# Patient Record
Sex: MALE | Race: WHITE
[De-identification: names, ages, dates, MRNs, and addresses within clinical notes are randomized per-mention and may not be internally consistent; named-entity substitution may affect disease eponyms.]

---

## 2020-04-01 ENCOUNTER — RX ONLY (RX ONLY)
Age: 70
End: 2020-04-01

## 2020-04-01 RX ORDER — METHOTREXATE 2.5 MG/1
1 TABLET TABLET ORAL AS DIRECTED
Qty: 72 | Refills: 0 | Status: DISCONTINUED
Start: 2020-04-01 | End: 2020-08-28

## 2020-04-01 RX ORDER — FOLIC ACID 1 MG/1
1 TABLET TABLET ORAL DAILY
Qty: 30 | Refills: 0 | Status: DISCONTINUED
Start: 2020-04-01 | End: 2020-07-21

## 2020-05-26 ENCOUNTER — FOLLOW-UP (OUTPATIENT)
Dept: URBAN - METROPOLITAN AREA CLINIC 13 | Facility: CLINIC | Age: 70
Setting detail: DERMATOLOGY
End: 2020-05-26

## 2020-05-26 DIAGNOSIS — L57.0 ACTINIC KERATOSIS: ICD-10-CM

## 2020-05-26 PROCEDURE — 99213 OFFICE O/P EST LOW 20 MIN: CPT

## 2020-07-21 ENCOUNTER — RX ONLY (RX ONLY)
Age: 70
End: 2020-07-21

## 2020-07-21 RX ORDER — FOLIC ACID 1 MG/1
1 TABLET TABLET ORAL DAILY
Qty: 30 | Refills: 0
Start: 2020-07-21

## 2020-08-28 ENCOUNTER — FOLLOW-UP (OUTPATIENT)
Dept: URBAN - METROPOLITAN AREA CLINIC 13 | Facility: CLINIC | Age: 70
Setting detail: DERMATOLOGY
End: 2020-08-28

## 2020-08-28 DIAGNOSIS — L30.9 DERMATITIS, UNSPECIFIED: ICD-10-CM

## 2020-08-28 DIAGNOSIS — L70.0 ACNE VULGARIS: ICD-10-CM

## 2020-08-28 DIAGNOSIS — L85.3 XEROSIS CUTIS: ICD-10-CM

## 2020-08-28 DIAGNOSIS — D22.9 MELANOCYTIC NEVI, UNSPECIFIED: ICD-10-CM

## 2020-08-28 DIAGNOSIS — Z87.2 PERSONAL HISTORY OF DISEASES OF THE SKIN AND SUBCUTANEOUS TISSUE: ICD-10-CM

## 2020-08-28 PROCEDURE — 99213 OFFICE O/P EST LOW 20 MIN: CPT

## 2020-08-28 RX ORDER — METHOTREXATE 2.5 MG/1
1 TABLET TABLET ORAL AS DIRECTED
Qty: 72 | Refills: 0
Start: 2020-08-28

## 2020-08-28 RX ORDER — FOLIC ACID 1 MG/1
1 TABLET TABLET ORAL DAILY
Qty: 90 | Refills: 3
Start: 2020-08-28

## 2020-12-11 ENCOUNTER — FOLLOW-UP (OUTPATIENT)
Dept: URBAN - METROPOLITAN AREA CLINIC 13 | Facility: CLINIC | Age: 70
Setting detail: DERMATOLOGY
End: 2020-12-11

## 2020-12-11 DIAGNOSIS — C44.321 SQUAMOUS CELL CARCINOMA OF SKIN OF NOSE: ICD-10-CM

## 2020-12-11 PROCEDURE — 99213 OFFICE O/P EST LOW 20 MIN: CPT

## 2020-12-11 RX ORDER — METHOTREXATE 2.5 MG/1
1 TABLET TABLET ORAL AS DIRECTED
Qty: 24 | Refills: 2
Start: 2020-12-11

## 2020-12-11 RX ORDER — FOLIC ACID 1 MG/1
1 TABLET TABLET ORAL DAILY
Qty: 30 | Refills: 11
Start: 2020-12-11

## 2021-03-12 ENCOUNTER — SKIN CHECK (OUTPATIENT)
Dept: URBAN - METROPOLITAN AREA CLINIC 13 | Facility: CLINIC | Age: 71
Setting detail: DERMATOLOGY
End: 2021-03-12

## 2021-03-12 DIAGNOSIS — L82.0 INFLAMED SEBORRHEIC KERATOSIS: ICD-10-CM

## 2021-03-12 PROCEDURE — 99213 OFFICE O/P EST LOW 20 MIN: CPT

## 2021-03-12 RX ORDER — FOLIC ACID 1 MG/1
1 TABLET TABLET ORAL DAILY
Qty: 90 | Refills: 3
Start: 2021-03-12

## 2021-03-12 RX ORDER — METHOTREXATE 2.5 MG/1
6 TABLET TABLET ORAL
Qty: 72 | Refills: 0
Start: 2021-03-12

## 2021-06-14 ENCOUNTER — FOLLOW-UP (OUTPATIENT)
Dept: URBAN - METROPOLITAN AREA CLINIC 13 | Facility: CLINIC | Age: 71
Setting detail: DERMATOLOGY
End: 2021-06-14

## 2021-06-14 DIAGNOSIS — D48.5 NEOPLASM OF UNCERTAIN BEHAVIOR OF SKIN: ICD-10-CM

## 2021-06-14 PROCEDURE — 99214 OFFICE O/P EST MOD 30 MIN: CPT

## 2021-06-14 RX ORDER — METHOTREXATE 2.5 MG/1
6 TABLET TABLET ORAL
Qty: 78 | Refills: 0
Start: 2021-06-14

## 2021-09-13 ENCOUNTER — FOLLOW-UP (OUTPATIENT)
Dept: URBAN - METROPOLITAN AREA CLINIC 13 | Facility: CLINIC | Age: 71
Setting detail: DERMATOLOGY
End: 2021-09-13

## 2021-09-13 DIAGNOSIS — D48.5 NEOPLASM OF UNCERTAIN BEHAVIOR OF SKIN: ICD-10-CM

## 2021-09-13 PROCEDURE — 99214 OFFICE O/P EST MOD 30 MIN: CPT

## 2021-09-13 RX ORDER — METHOTREXATE 2.5 MG/1
TABLET ORAL AS DIRECTED
Qty: 32 | Refills: 2
Start: 2021-09-13

## 2021-12-17 ENCOUNTER — FOLLOW-UP (OUTPATIENT)
Dept: URBAN - METROPOLITAN AREA CLINIC 13 | Facility: CLINIC | Age: 71
Setting detail: DERMATOLOGY
End: 2021-12-17

## 2021-12-17 DIAGNOSIS — D48.5 NEOPLASM OF UNCERTAIN BEHAVIOR OF SKIN: ICD-10-CM

## 2021-12-17 PROCEDURE — 99214 OFFICE O/P EST MOD 30 MIN: CPT

## 2021-12-20 ENCOUNTER — RX ONLY (RX ONLY)
Age: 71
End: 2021-12-20

## 2021-12-20 RX ORDER — METHOTREXATE 2.5 MG/1
TABLET ORAL AS DIRECTED
Qty: 24 | Refills: 2
Start: 2021-12-20

## 2021-12-22 ENCOUNTER — RX ONLY (RX ONLY)
Age: 71
End: 2021-12-22

## 2021-12-22 RX ORDER — TILDRAKIZUMAB-ASMN 100 MG/ML
INJECTION, SOLUTION SUBCUTANEOUS
Qty: 1 | Refills: 1
Start: 2021-12-22

## 2021-12-30 ENCOUNTER — RX ONLY (RX ONLY)
Age: 71
End: 2021-12-30

## 2021-12-30 RX ORDER — METHOTREXATE 2.5 MG/1
TABLET ORAL AS DIRECTED
Qty: 24 | Refills: 1
Start: 2021-12-30

## 2021-12-30 RX ORDER — FOLIC ACID 1 MG/1
1 TABLET TABLET ORAL DAILY
Qty: 90 | Refills: 2
Start: 2021-12-30

## 2022-02-11 ENCOUNTER — RX ONLY (RX ONLY)
Age: 72
End: 2022-02-11

## 2022-02-11 RX ORDER — USTEKINUMAB 45 MG/.5ML
INJECTION, SOLUTION SUBCUTANEOUS
Qty: 0.5 | Refills: 1
Start: 2022-02-11

## 2022-03-18 ENCOUNTER — OTHER- (OUTPATIENT)
Dept: URBAN - METROPOLITAN AREA CLINIC 13 | Facility: CLINIC | Age: 72
Setting detail: DERMATOLOGY
End: 2022-03-18

## 2022-03-18 DIAGNOSIS — L57.0 ACTINIC KERATOSIS: ICD-10-CM

## 2022-03-18 PROBLEM — L408 696.1: Status: ACTIVE | Noted: 2022-03-18

## 2022-03-18 PROBLEM — L402 696.1: Status: ACTIVE | Noted: 2022-03-18

## 2022-03-18 PROBLEM — L404 696.1: Status: ACTIVE | Noted: 2022-03-18

## 2022-03-18 PROBLEM — L401 696.1: Status: ACTIVE | Noted: 2022-03-18

## 2022-03-18 PROBLEM — L400 696.1: Status: ACTIVE | Noted: 2022-03-18

## 2022-03-18 PROBLEM — L403 696.1: Status: ACTIVE | Noted: 2022-03-18

## 2022-03-18 PROCEDURE — 96372 THER/PROPH/DIAG INJ SC/IM: CPT

## 2022-06-13 ENCOUNTER — OTHER- (OUTPATIENT)
Dept: URBAN - METROPOLITAN AREA CLINIC 11 | Facility: CLINIC | Age: 72
Setting detail: DERMATOLOGY
End: 2022-06-13

## 2022-06-13 DIAGNOSIS — L82.0 INFLAMED SEBORRHEIC KERATOSIS: ICD-10-CM

## 2022-06-13 PROCEDURE — 96372 THER/PROPH/DIAG INJ SC/IM: CPT

## 2022-08-23 ENCOUNTER — RX ONLY (RX ONLY)
Age: 72
End: 2022-08-23

## 2022-08-23 RX ORDER — TILDRAKIZUMAB-ASMN 100 MG/ML
1 SYRINGE INJECTION, SOLUTION SUBCUTANEOUS
Qty: 1 | Refills: 1
Start: 2022-08-23

## 2022-09-14 ENCOUNTER — APPOINTMENT (OUTPATIENT)
Dept: URBAN - METROPOLITAN AREA SURGERY 19 | Age: 72
Setting detail: DERMATOLOGY
End: 2022-09-15

## 2022-09-14 DIAGNOSIS — L40.0 PSORIASIS VULGARIS: ICD-10-CM

## 2022-09-14 PROCEDURE — OTHER ILUMYA INJECTION: OTHER

## 2022-09-14 PROCEDURE — 96372 THER/PROPH/DIAG INJ SC/IM: CPT

## 2022-09-14 ASSESSMENT — LOCATION ZONE DERM: LOCATION ZONE: ARM

## 2022-09-14 ASSESSMENT — LOCATION SIMPLE DESCRIPTION DERM: LOCATION SIMPLE: LEFT SHOULDER

## 2022-09-14 ASSESSMENT — LOCATION DETAILED DESCRIPTION DERM: LOCATION DETAILED: LEFT POSTERIOR SHOULDER

## 2022-09-14 NOTE — HPI: OTHER
Condition:: Ilumya injection
Please Describe Your Condition:: Pt is currently treating his psoriasis with Ilumya

## 2022-12-14 ENCOUNTER — APPOINTMENT (OUTPATIENT)
Dept: URBAN - METROPOLITAN AREA SURGERY 19 | Age: 72
Setting detail: DERMATOLOGY
End: 2022-12-18

## 2022-12-14 DIAGNOSIS — L40.0 PSORIASIS VULGARIS: ICD-10-CM

## 2022-12-14 PROCEDURE — OTHER MIPS QUALITY: OTHER

## 2022-12-14 PROCEDURE — OTHER COUNSELING: OTHER

## 2022-12-14 PROCEDURE — OTHER ILUMYA INJECTION: OTHER

## 2022-12-14 PROCEDURE — 96372 THER/PROPH/DIAG INJ SC/IM: CPT

## 2022-12-14 ASSESSMENT — PGA PSORIASIS: PGA PSORIASIS 2020: ALMOST CLEAR

## 2022-12-14 ASSESSMENT — LOCATION DETAILED DESCRIPTION DERM: LOCATION DETAILED: LEFT ANTERIOR SHOULDER

## 2022-12-14 ASSESSMENT — LOCATION SIMPLE DESCRIPTION DERM: LOCATION SIMPLE: LEFT SHOULDER

## 2022-12-14 ASSESSMENT — BSA PSORIASIS: % BODY COVERED IN PSORIASIS: 2

## 2022-12-14 ASSESSMENT — LOCATION ZONE DERM: LOCATION ZONE: ARM

## 2022-12-14 NOTE — HPI: PROCEDURE (INJECTION)
Have You Had This Injection Before?: has been previously injected
Body Location Override (Optional): Left deltoid
When Was Your Last Injection?: 09/14/2022

## 2023-01-24 ENCOUNTER — RX ONLY (RX ONLY)
Age: 73
End: 2023-01-24

## 2023-01-24 RX ORDER — TILDRAKIZUMAB-ASMN 100 MG/ML
INJECTION, SOLUTION SUBCUTANEOUS
Qty: 1 | Refills: 1 | Status: ERX | COMMUNITY
Start: 2023-01-24

## 2023-02-07 ENCOUNTER — RX ONLY (RX ONLY)
Age: 73
End: 2023-02-07

## 2023-02-07 RX ORDER — TILDRAKIZUMAB-ASMN 100 MG/ML
INJECTION, SOLUTION SUBCUTANEOUS
Qty: 1 | Refills: 1 | Status: ERX

## 2023-03-29 ENCOUNTER — APPOINTMENT (OUTPATIENT)
Dept: URBAN - METROPOLITAN AREA SURGERY 19 | Age: 73
Setting detail: DERMATOLOGY
End: 2023-03-30

## 2023-03-29 DIAGNOSIS — L40.0 PSORIASIS VULGARIS: ICD-10-CM

## 2023-03-29 PROCEDURE — 96372 THER/PROPH/DIAG INJ SC/IM: CPT

## 2023-03-29 PROCEDURE — OTHER ILUMYA INJECTION: OTHER

## 2023-03-29 ASSESSMENT — LOCATION SIMPLE DESCRIPTION DERM: LOCATION SIMPLE: LEFT SHOULDER

## 2023-03-29 ASSESSMENT — LOCATION DETAILED DESCRIPTION DERM: LOCATION DETAILED: LEFT ANTERIOR SHOULDER

## 2023-03-29 ASSESSMENT — LOCATION ZONE DERM: LOCATION ZONE: ARM

## 2023-03-29 NOTE — HPI: PROCEDURE (INJECTION)
Have You Had This Injection Before?: has been previously injected
When Was Your Last Injection?: 12/12/2022

## 2023-05-09 ENCOUNTER — RX ONLY (RX ONLY)
Age: 73
End: 2023-05-09

## 2023-05-09 RX ORDER — TILDRAKIZUMAB-ASMN 100 MG/ML
INJECTION, SOLUTION SUBCUTANEOUS
Qty: 1 | Refills: 1 | Status: ERX

## 2023-06-30 ENCOUNTER — APPOINTMENT (OUTPATIENT)
Dept: URBAN - METROPOLITAN AREA SURGERY 19 | Age: 73
Setting detail: DERMATOLOGY
End: 2023-06-30

## 2023-06-30 DIAGNOSIS — L40.0 PSORIASIS VULGARIS: ICD-10-CM

## 2023-06-30 PROCEDURE — OTHER ILUMYA INJECTION: OTHER

## 2023-06-30 PROCEDURE — 96372 THER/PROPH/DIAG INJ SC/IM: CPT

## 2023-06-30 ASSESSMENT — LOCATION ZONE DERM: LOCATION ZONE: ARM

## 2023-06-30 ASSESSMENT — LOCATION SIMPLE DESCRIPTION DERM: LOCATION SIMPLE: RIGHT UPPER ARM

## 2023-06-30 ASSESSMENT — LOCATION DETAILED DESCRIPTION DERM: LOCATION DETAILED: RIGHT PROXIMAL POSTERIOR UPPER ARM

## 2023-09-28 ENCOUNTER — RX ONLY (RX ONLY)
Age: 73
End: 2023-09-28

## 2023-09-28 RX ORDER — TILDRAKIZUMAB-ASMN 100 MG/ML
INJECTION, SOLUTION SUBCUTANEOUS
Qty: 1 | Refills: 1 | Status: ERX

## 2023-10-09 ENCOUNTER — APPOINTMENT (OUTPATIENT)
Dept: URBAN - METROPOLITAN AREA SURGERY 19 | Age: 73
Setting detail: DERMATOLOGY
End: 2023-10-12

## 2023-10-09 DIAGNOSIS — L40.0 PSORIASIS VULGARIS: ICD-10-CM

## 2023-10-09 PROCEDURE — 99213 OFFICE O/P EST LOW 20 MIN: CPT | Mod: 25

## 2023-10-09 PROCEDURE — 36415 COLL VENOUS BLD VENIPUNCTURE: CPT

## 2023-10-09 PROCEDURE — OTHER COUNSELING: OTHER

## 2023-10-09 PROCEDURE — OTHER MIPS QUALITY: OTHER

## 2023-10-09 PROCEDURE — OTHER ILUMYA INJECTION: OTHER

## 2023-10-09 PROCEDURE — 96372 THER/PROPH/DIAG INJ SC/IM: CPT

## 2023-10-09 PROCEDURE — OTHER VENIPUNCTURE: OTHER

## 2023-10-09 PROCEDURE — OTHER ORDER TESTS: OTHER

## 2023-10-09 ASSESSMENT — BSA PSORIASIS: % BODY COVERED IN PSORIASIS: 0

## 2023-10-09 ASSESSMENT — PGA PSORIASIS: PGA PSORIASIS 2020: CLEAR

## 2023-10-09 ASSESSMENT — ITCH NUMERIC RATING SCALE: HOW SEVERE IS YOUR ITCHING?: 0

## 2023-10-09 ASSESSMENT — LOCATION SIMPLE DESCRIPTION DERM: LOCATION SIMPLE: RIGHT UPPER ARM

## 2023-10-09 ASSESSMENT — LOCATION DETAILED DESCRIPTION DERM: LOCATION DETAILED: RIGHT PROXIMAL POSTERIOR UPPER ARM

## 2023-10-09 ASSESSMENT — LOCATION ZONE DERM: LOCATION ZONE: ARM

## 2023-10-09 NOTE — PROCEDURE: ORDER TESTS
Lab Facility: [id number]
Bill For Surgical Tray: no
Billing Type: Third-Party Bill
Performing Laboratory: [id number]
Expected Date Of Service: 10/09/2023

## 2023-10-09 NOTE — PROCEDURE: COUNSELING
Patient Specific Counseling (Will Not Stick From Patient To Patient): encouraged routine immunizations and boosters including flu and covid vaccines this fall.
Detail Level: Detailed

## 2023-10-09 NOTE — PROCEDURE: VENIPUNCTURE
Bill [id number] For Specimen Handling/Conveyance To Laboratory?: no
Detail Level: None
Venipuncture Paragraph: An alcohol pad was applied to the venipuncture site. Venipuncture was performed using a butterfly needle. Pressure and a bandaid was applied to the site. No complications were noted.

## 2023-10-09 NOTE — PROCEDURE: MIPS QUALITY
Quality 226: Preventive Care And Screening: Tobacco Use: Screening And Cessation Intervention: Patient screened for tobacco use, is a smoker AND received Cessation Counseling within measurement period or in the six months prior to the measurement period
Quality 110: Preventive Care And Screening: Influenza Immunization: Influenza Immunization Ordered or Recommended, but not Administered due to system reason
Quality 130: Documentation Of Current Medications In The Medical Record: Current Medications Documented
Detail Level: Detailed

## 2023-11-20 ENCOUNTER — RX ONLY (RX ONLY)
Age: 73
End: 2023-11-20

## 2023-11-20 RX ORDER — TILDRAKIZUMAB-ASMN 100 MG/ML
INJECTION, SOLUTION SUBCUTANEOUS
Qty: 1 | Refills: 1 | Status: ERX

## 2023-12-11 ENCOUNTER — RX ONLY (RX ONLY)
Age: 73
End: 2023-12-11

## 2023-12-11 RX ORDER — TILDRAKIZUMAB-ASMN 100 MG/ML
INJECTION, SOLUTION SUBCUTANEOUS
Qty: 1 | Refills: 1 | Status: ERX

## 2024-01-03 ENCOUNTER — APPOINTMENT (OUTPATIENT)
Dept: URBAN - METROPOLITAN AREA SURGERY 19 | Age: 74
Setting detail: DERMATOLOGY
End: 2024-01-04

## 2024-01-03 DIAGNOSIS — L40.0 PSORIASIS VULGARIS: ICD-10-CM

## 2024-01-03 PROCEDURE — OTHER ILUMYA INJECTION: OTHER

## 2024-01-03 PROCEDURE — OTHER COUNSELING: OTHER

## 2024-01-03 PROCEDURE — OTHER MIPS QUALITY: OTHER

## 2024-01-03 PROCEDURE — 96372 THER/PROPH/DIAG INJ SC/IM: CPT

## 2024-01-03 ASSESSMENT — LOCATION ZONE DERM: LOCATION ZONE: ARM

## 2024-01-03 ASSESSMENT — LOCATION SIMPLE DESCRIPTION DERM: LOCATION SIMPLE: RIGHT UPPER ARM

## 2024-01-03 ASSESSMENT — LOCATION DETAILED DESCRIPTION DERM: LOCATION DETAILED: RIGHT PROXIMAL POSTERIOR UPPER ARM

## 2024-01-03 NOTE — PROCEDURE: ILUMYA INJECTION
Consent: The risks of pain and injection site reactions were reviewed with the patient prior to the injection.
J-Code Units (100 Units Per Syringe): 100
Administered By (Optional): Hayley Bernal CDT
Hide Non-Enhanced Ndc Variable: No
Lot # (Optional): [id number]
J-Code: 
Syringe Size Used (Required For Enhanced Ndc): 100mg/ml prefilled syringe
Ilumya Amount: 100 mg
Expiration Date (Optional): 12/2024
Use Enhanced Ndc?: Yes
Ndc (100 Mg/Ml Syringe): [address]-51
Detail Level: None

## 2024-01-03 NOTE — PROCEDURE: MIPS QUALITY
Quality 226: Preventive Care And Screening: Tobacco Use: Screening And Cessation Intervention: Patient screened for tobacco use, is a smoker AND received Cessation Counseling within measurement period or in the six months prior to the measurement period
Quality 410: Psoriasis Clinical Response To Oral Systemic Or Biologic Medications: Psoriasis Assessment Tool Documented, Met Specified Benchmark
Quality 110: Preventive Care And Screening: Influenza Immunization: Influenza Immunization Ordered or Recommended, but not Administered due to system reason
Quality 176: Tuberculosis Screening Prior To First Course Of Biologic And/Or Immune Response Modifier Therapy: Patient receiving first-time biologic and/or immune response modifier therapy, TB Screening Performed and Results Interpreted within 12 months
Quality 130: Documentation Of Current Medications In The Medical Record: Current Medications Documented
Detail Level: Detailed

## 2024-04-17 ENCOUNTER — APPOINTMENT (OUTPATIENT)
Dept: URBAN - METROPOLITAN AREA SURGERY 19 | Age: 74
Setting detail: DERMATOLOGY
End: 2024-04-19

## 2024-04-17 DIAGNOSIS — L40.0 PSORIASIS VULGARIS: ICD-10-CM

## 2024-04-17 PROCEDURE — 96372 THER/PROPH/DIAG INJ SC/IM: CPT

## 2024-04-17 PROCEDURE — OTHER COUNSELING: OTHER

## 2024-04-17 PROCEDURE — OTHER ILUMYA MONITORING: OTHER

## 2024-04-17 PROCEDURE — OTHER MIPS QUALITY: OTHER

## 2024-04-17 PROCEDURE — OTHER ILUMYA INJECTION: OTHER

## 2024-04-17 PROCEDURE — 99213 OFFICE O/P EST LOW 20 MIN: CPT | Mod: 25

## 2024-04-17 PROCEDURE — OTHER PRESCRIPTION MEDICATION MANAGEMENT: OTHER

## 2024-04-17 ASSESSMENT — LOCATION SIMPLE DESCRIPTION DERM
LOCATION SIMPLE: RIGHT ELBOW
LOCATION SIMPLE: LEFT ELBOW
LOCATION SIMPLE: LEFT SHOULDER

## 2024-04-17 ASSESSMENT — BSA PSORIASIS: % BODY COVERED IN PSORIASIS: 0

## 2024-04-17 ASSESSMENT — LOCATION DETAILED DESCRIPTION DERM
LOCATION DETAILED: RIGHT ELBOW
LOCATION DETAILED: LEFT POSTERIOR SHOULDER
LOCATION DETAILED: LEFT ELBOW

## 2024-04-17 ASSESSMENT — PGA PSORIASIS: PGA PSORIASIS 2020: CLEAR

## 2024-04-17 ASSESSMENT — ITCH NUMERIC RATING SCALE: HOW SEVERE IS YOUR ITCHING?: 0

## 2024-04-17 ASSESSMENT — LOCATION ZONE DERM: LOCATION ZONE: ARM

## 2024-04-17 NOTE — PROCEDURE: ILUMYA INJECTION
Consent: The risks of pain and injection site reactions were reviewed with the patient prior to the injection.
J-Code Units (100 Units Per Syringe): 100
Administered By (Optional): Hayley Bernal CDT
Hide Non-Enhanced Ndc Variable: No
Lot # (Optional): [id number]
J-Code: 
Syringe Size Used (Required For Enhanced Ndc): 100mg/ml prefilled syringe
Ilumya Amount: 100 mg
Expiration Date (Optional): 04/2025
Use Enhanced Ndc?: Yes
Ndc (100 Mg/Ml Syringe): [address]-17
Detail Level: None

## 2024-04-17 NOTE — PROCEDURE: PRESCRIPTION MEDICATION MANAGEMENT
Plan: Discussed healthy lifestyle, exercise and diet
Detail Level: Generalized
Continue Regimen: Ilumya Q12 weeks
Render In Strict Bullet Format?: No

## 2024-07-26 ENCOUNTER — APPOINTMENT (OUTPATIENT)
Dept: URBAN - METROPOLITAN AREA SURGERY 19 | Age: 74
Setting detail: DERMATOLOGY
End: 2024-07-29

## 2024-07-26 DIAGNOSIS — L57.8 OTHER SKIN CHANGES DUE TO CHRONIC EXPOSURE TO NONIONIZING RADIATION: ICD-10-CM

## 2024-07-26 DIAGNOSIS — B35.1 TINEA UNGUIUM: ICD-10-CM

## 2024-07-26 DIAGNOSIS — D22 MELANOCYTIC NEVI: ICD-10-CM

## 2024-07-26 DIAGNOSIS — D18.0 HEMANGIOMA: ICD-10-CM

## 2024-07-26 DIAGNOSIS — L21.8 OTHER SEBORRHEIC DERMATITIS: ICD-10-CM

## 2024-07-26 DIAGNOSIS — L40.0 PSORIASIS VULGARIS: ICD-10-CM

## 2024-07-26 DIAGNOSIS — L82.1 OTHER SEBORRHEIC KERATOSIS: ICD-10-CM

## 2024-07-26 PROBLEM — D18.01 HEMANGIOMA OF SKIN AND SUBCUTANEOUS TISSUE: Status: ACTIVE | Noted: 2024-07-26

## 2024-07-26 PROBLEM — D22.5 MELANOCYTIC NEVI OF TRUNK: Status: ACTIVE | Noted: 2024-07-26

## 2024-07-26 PROCEDURE — OTHER MIPS QUALITY: OTHER

## 2024-07-26 PROCEDURE — 99214 OFFICE O/P EST MOD 30 MIN: CPT | Mod: 25

## 2024-07-26 PROCEDURE — 96372 THER/PROPH/DIAG INJ SC/IM: CPT

## 2024-07-26 PROCEDURE — OTHER COUNSELING: OTHER

## 2024-07-26 PROCEDURE — OTHER ILUMYA MONITORING: OTHER

## 2024-07-26 PROCEDURE — OTHER ILUMYA INJECTION: OTHER

## 2024-07-26 ASSESSMENT — ITCH NUMERIC RATING SCALE: HOW SEVERE IS YOUR ITCHING?: 0

## 2024-07-26 ASSESSMENT — LOCATION SIMPLE DESCRIPTION DERM
LOCATION SIMPLE: TRAPEZIAL NECK
LOCATION SIMPLE: CHEST
LOCATION SIMPLE: LEFT CHEEK
LOCATION SIMPLE: LEFT UPPER ARM
LOCATION SIMPLE: UPPER BACK
LOCATION SIMPLE: LEFT GREAT TOE
LOCATION SIMPLE: RIGHT GREAT TOE
LOCATION SIMPLE: ABDOMEN

## 2024-07-26 ASSESSMENT — LOCATION DETAILED DESCRIPTION DERM
LOCATION DETAILED: RIGHT GREAT TOENAIL
LOCATION DETAILED: EPIGASTRIC SKIN
LOCATION DETAILED: INFERIOR THORACIC SPINE
LOCATION DETAILED: LEFT GREAT TOENAIL
LOCATION DETAILED: SUPERIOR THORACIC SPINE
LOCATION DETAILED: MID TRAPEZIAL NECK
LOCATION DETAILED: LEFT CENTRAL MALAR CHEEK
LOCATION DETAILED: LEFT PROXIMAL LATERAL POSTERIOR UPPER ARM
LOCATION DETAILED: RIGHT MEDIAL SUPERIOR CHEST
LOCATION DETAILED: MIDDLE STERNUM

## 2024-07-26 ASSESSMENT — LOCATION ZONE DERM
LOCATION ZONE: TOENAIL
LOCATION ZONE: FACE
LOCATION ZONE: NECK
LOCATION ZONE: ARM
LOCATION ZONE: TRUNK

## 2024-07-26 ASSESSMENT — PGA PSORIASIS: PGA PSORIASIS 2020: CLEAR

## 2024-07-26 ASSESSMENT — BSA PSORIASIS: % BODY COVERED IN PSORIASIS: 0

## 2024-07-26 NOTE — PROCEDURE: MIPS QUALITY
Additional Notes: *Tobacco counseling - patient recommended to follow-up with PCP for tobacco cessation (21 & up)
Quality 226: Preventive Care And Screening: Tobacco Use: Screening And Cessation Intervention: Patient screened for tobacco use, is a smoker AND received Cessation Counseling within measurement period or in the six months prior to the measurement period
Detail Level: Detailed

## 2024-07-26 NOTE — PROCEDURE: ILUMYA INJECTION
Hide Non-Enhanced Ndc Variable: No
Expiration Date (Optional): 04/25
Render If Medication Purchased By Clinic In Visit Note?: Yes
Syringe Size Used (Required For Enhanced Ndc): 100mg/ml prefilled syringe
Detail Level: None
Consent: The risks of pain and injection site reactions were reviewed with the patient prior to the injection.
Ndc (100 Mg/Ml Syringe): [address]-90
Ilumya Amount: 100 mg
J-Code Units (100 Units Per Syringe): 100
J-Code: 
Lot # (Optional): [id number]

## 2024-09-05 ENCOUNTER — RX ONLY (RX ONLY)
Age: 74
End: 2024-09-05

## 2024-09-05 RX ORDER — TILDRAKIZUMAB-ASMN 100 MG/ML
INJECTION, SOLUTION SUBCUTANEOUS
Qty: 1 | Refills: 0 | Status: ERX

## 2024-10-18 ENCOUNTER — APPOINTMENT (OUTPATIENT)
Dept: URBAN - METROPOLITAN AREA SURGERY 19 | Age: 74
Setting detail: DERMATOLOGY
End: 2024-10-23

## 2024-10-18 DIAGNOSIS — L40.0 PSORIASIS VULGARIS: ICD-10-CM

## 2024-10-18 PROCEDURE — OTHER ILUMYA MONITORING: OTHER

## 2024-10-18 PROCEDURE — OTHER ORDER TESTS: OTHER

## 2024-10-18 PROCEDURE — OTHER MIPS QUALITY: OTHER

## 2024-10-18 PROCEDURE — OTHER COUNSELING: OTHER

## 2024-10-18 PROCEDURE — OTHER ILUMYA INJECTION: OTHER

## 2024-10-18 PROCEDURE — 96372 THER/PROPH/DIAG INJ SC/IM: CPT

## 2024-10-18 ASSESSMENT — LOCATION DETAILED DESCRIPTION DERM: LOCATION DETAILED: RIGHT POSTERIOR SHOULDER

## 2024-10-18 ASSESSMENT — LOCATION SIMPLE DESCRIPTION DERM: LOCATION SIMPLE: RIGHT SHOULDER

## 2024-10-18 ASSESSMENT — LOCATION ZONE DERM: LOCATION ZONE: ARM

## 2024-10-18 NOTE — PROCEDURE: ILUMYA INJECTION
Hide Non-Enhanced Ndc Variable: No
Expiration Date (Optional): 10/17/2025
Additional Comments: NDC [address]-30
Render If Medication Purchased By Clinic In Visit Note?: Yes
Syringe Size Used (Required For Enhanced Ndc): 100mg/ml prefilled syringe
Detail Level: Simple
Consent: The risks of pain and injection site reactions were reviewed with the patient prior to the injection.
Ndc (100 Mg/Ml Syringe): [address]-27
Ilumya Amount: 100 mg
J-Code Units (100 Units Per Syringe): 100
Administered By (Optional): Maryam OCHOA
J-Code:

## 2024-10-18 NOTE — PROCEDURE: ORDER TESTS
Expected Date Of Service: 10/21/2024
Performing Laboratory: -[id number]
Billing Type: Third-Party Bill
Bill For Surgical Tray: no

## 2025-02-10 ENCOUNTER — APPOINTMENT (OUTPATIENT)
Dept: URBAN - METROPOLITAN AREA SURGERY 19 | Age: 75
Setting detail: DERMATOLOGY
End: 2025-02-10

## 2025-02-10 DIAGNOSIS — L40.0 PSORIASIS VULGARIS: ICD-10-CM

## 2025-02-10 PROCEDURE — OTHER MIPS QUALITY: OTHER

## 2025-02-10 PROCEDURE — OTHER ILUMYA MONITORING: OTHER

## 2025-02-10 PROCEDURE — OTHER COUNSELING: OTHER

## 2025-02-10 ASSESSMENT — LOCATION DETAILED DESCRIPTION DERM: LOCATION DETAILED: RIGHT POSTERIOR SHOULDER

## 2025-02-10 ASSESSMENT — LOCATION ZONE DERM: LOCATION ZONE: ARM

## 2025-02-10 ASSESSMENT — LOCATION SIMPLE DESCRIPTION DERM: LOCATION SIMPLE: RIGHT SHOULDER

## 2025-03-03 ENCOUNTER — RX ONLY (RX ONLY)
Age: 75
End: 2025-03-03

## 2025-03-03 RX ORDER — TILDRAKIZUMAB-ASMN 100 MG/ML
INJECTION, SOLUTION SUBCUTANEOUS
Qty: 1 | Refills: 0 | Status: ERX

## 2025-03-24 ENCOUNTER — APPOINTMENT (OUTPATIENT)
Dept: URBAN - METROPOLITAN AREA SURGERY 19 | Age: 75
Setting detail: DERMATOLOGY
End: 2025-03-25

## 2025-03-24 DIAGNOSIS — L40.0 PSORIASIS VULGARIS: ICD-10-CM

## 2025-03-24 PROCEDURE — OTHER ILUMYA MONITORING: OTHER

## 2025-03-24 PROCEDURE — OTHER MIPS QUALITY: OTHER

## 2025-03-24 PROCEDURE — OTHER COUNSELING: OTHER

## 2025-03-24 PROCEDURE — OTHER ILUMYA INJECTION: OTHER

## 2025-03-24 PROCEDURE — 99213 OFFICE O/P EST LOW 20 MIN: CPT

## 2025-03-24 ASSESSMENT — LOCATION SIMPLE DESCRIPTION DERM: LOCATION SIMPLE: LEFT UPPER ARM

## 2025-03-24 ASSESSMENT — BSA PSORIASIS: % BODY COVERED IN PSORIASIS: 0

## 2025-03-24 ASSESSMENT — LOCATION DETAILED DESCRIPTION DERM: LOCATION DETAILED: LEFT PROXIMAL LATERAL POSTERIOR UPPER ARM

## 2025-03-24 ASSESSMENT — ITCH NUMERIC RATING SCALE: HOW SEVERE IS YOUR ITCHING?: 1

## 2025-03-24 ASSESSMENT — LOCATION ZONE DERM: LOCATION ZONE: ARM

## 2025-03-24 ASSESSMENT — PGA PSORIASIS: PGA PSORIASIS 2020: CLEAR

## 2025-03-24 NOTE — PROCEDURE: ILUMYA INJECTION
Hide Non-Enhanced Ndc Variable: No
Expiration Date (Optional): 01/2027
Additional Comments: NDC [address]-21
Render If Medication Purchased By Clinic In Visit Note?: Yes
Syringe Size Used (Required For Enhanced Ndc): 100mg/ml prefilled syringe
Detail Level: Simple
Consent: The risks of pain and injection site reactions were reviewed with the patient prior to the injection.
Ndc (100 Mg/Ml Syringe): [address]-99
Ilumya Amount: 100 mg
J-Code Units (100 Units Per Syringe): 100
Administered By (Optional): Anabella Luna MA
J-Code: 
Lot # (Optional): [id number]

## 2025-03-24 NOTE — PROCEDURE: MIPS QUALITY
Additional Notes: Medications documented to the best of my ability with the resources available.
Detail Level: Detailed
Quality 485: Psoriasis - Improvement In Patient-Reported Itch Severity: Itch severity assessment score is reduced by 3 or more points from the initial (index) assessment score to the follow-up visit score
Quality 130: Documentation Of Current Medications In The Medical Record: Current Medications Documented
Quality 410: Psoriasis Clinical Response To Oral Systemic Or Biologic Medications: Documentation that the patient declined therapy change
Quality 226: Preventive Care And Screening: Tobacco Use: Screening And Cessation Intervention: Patient screened for tobacco use, is a smoker AND received Cessation Counseling within measurement period or in the six months prior to the measurement period

## 2025-05-28 ENCOUNTER — RX ONLY (RX ONLY)
Age: 75
End: 2025-05-28

## 2025-05-28 RX ORDER — TILDRAKIZUMAB-ASMN 100 MG/ML
INJECTION, SOLUTION SUBCUTANEOUS
Qty: 1 | Refills: 1 | Status: ERX

## 2025-07-01 ENCOUNTER — APPOINTMENT (OUTPATIENT)
Dept: URBAN - METROPOLITAN AREA SURGERY 19 | Age: 75
Setting detail: DERMATOLOGY
End: 2025-07-02

## 2025-07-01 DIAGNOSIS — L40.0 PSORIASIS VULGARIS: ICD-10-CM

## 2025-07-01 PROCEDURE — OTHER COUNSELING: OTHER

## 2025-07-01 PROCEDURE — OTHER ILUMYA INJECTION: OTHER

## 2025-07-01 PROCEDURE — OTHER MIPS QUALITY: OTHER

## 2025-07-01 PROCEDURE — 96372 THER/PROPH/DIAG INJ SC/IM: CPT

## 2025-07-01 ASSESSMENT — LOCATION SIMPLE DESCRIPTION DERM: LOCATION SIMPLE: LEFT UPPER ARM

## 2025-07-01 ASSESSMENT — LOCATION DETAILED DESCRIPTION DERM: LOCATION DETAILED: LEFT PROXIMAL LATERAL POSTERIOR UPPER ARM

## 2025-07-01 ASSESSMENT — LOCATION ZONE DERM: LOCATION ZONE: ARM
